# Patient Record
Sex: MALE | URBAN - METROPOLITAN AREA
[De-identification: names, ages, dates, MRNs, and addresses within clinical notes are randomized per-mention and may not be internally consistent; named-entity substitution may affect disease eponyms.]

---

## 2023-05-01 ENCOUNTER — OFFICE VISIT (OUTPATIENT)
Dept: URBAN - METROPOLITAN AREA CLINIC 44 | Facility: CLINIC | Age: 68
End: 2023-05-01
Payer: COMMERCIAL

## 2023-05-01 DIAGNOSIS — H52.03 HYPERMETROPIA, BILATERAL: Primary | ICD-10-CM

## 2023-05-01 DIAGNOSIS — H50.112 MONOCULAR EXOTROPIA, LEFT EYE: ICD-10-CM

## 2023-05-01 DIAGNOSIS — H25.813 COMBINED FORMS OF AGE-RELATED CATARACT, BILATERAL: ICD-10-CM

## 2023-05-01 PROCEDURE — 92004 COMPRE OPH EXAM NEW PT 1/>: CPT | Performed by: OPTOMETRIST

## 2023-05-01 ASSESSMENT — VISUAL ACUITY
OS: 20/20
OD: 20/20

## 2023-05-01 ASSESSMENT — INTRAOCULAR PRESSURE
OS: 14
OD: 10

## 2023-05-01 ASSESSMENT — KERATOMETRY
OS: 47.00
OD: 45.25

## 2023-05-01 NOTE — IMPRESSION/PLAN
Impression: Combined forms of age-related cataract, bilateral: H25.813. Plan: PLAN: Continue to observe condition. RTC 12 months for complete exam complete. RTC sooner if patient symptoms become worse.

## 2023-05-01 NOTE — IMPRESSION/PLAN
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 Hx of strab Sx when a child. Denies double vision Plan: PLAN: Rx updated. No prism.  Suppresses OS